# Patient Record
Sex: FEMALE | Race: WHITE | URBAN - METROPOLITAN AREA
[De-identification: names, ages, dates, MRNs, and addresses within clinical notes are randomized per-mention and may not be internally consistent; named-entity substitution may affect disease eponyms.]

---

## 2019-04-29 LAB
B-HCG FREE SERPL-ACNC: 1 MIU/ML
BUN SERPL-MCNC: 15 MG/DL (ref 7–18)
CALCIUM SERPL-MCNC: 8.9 MG/DL (ref 8.5–10.1)
CHLORIDE SERPL-SCNC: 105 MMOL/L (ref 98–107)
CO2 SERPL-SCNC: 31 MMOL/L (ref 21–32)
CREAT SERPL-MCNC: 0.7 MG/DL (ref 0.55–1.02)
CREAT/UREA NIT SERPL: 21.4
ERYTHROCYTE [DISTWIDTH] IN BLOOD BY AUTOMATED COUNT: 13.2 % (ref 11.5–17)
GLUCOSE SERPL-MCNC: 92 MG/DL (ref 74–106)
HCT VFR BLD AUTO: 41 % (ref 37–47)
HGB BLD-MCNC: 13.7 GM/DL (ref 12–16)
MCH RBC QN AUTO: 30.4 PG (ref 27–31)
MCHC RBC AUTO-ENTMCNC: 33.4 GM/DL (ref 33–36)
MCV RBC AUTO: 90.9 FL (ref 80–94)
PLATELET # BLD AUTO: 297 X10(3)/MCL (ref 130–400)
PMV BLD AUTO: 11.6 FL (ref 9.4–12.4)
POTASSIUM SERPL-SCNC: 4 MMOL/L (ref 3.5–5.1)
RBC # BLD AUTO: 4.51 X10(6)/MCL (ref 4.2–5.4)
SODIUM SERPL-SCNC: 140 MMOL/L (ref 136–145)
WBC # SPEC AUTO: 7.4 X10(3)/MCL (ref 4.5–11.5)

## 2019-05-02 ENCOUNTER — HISTORICAL (OUTPATIENT)
Dept: ADMINISTRATIVE | Facility: HOSPITAL | Age: 59
End: 2019-05-02

## 2019-05-02 LAB
B-HCG FREE SERPL-ACNC: <1 MIU/ML
GROUP & RH: NORMAL

## 2022-04-09 ENCOUNTER — HISTORICAL (OUTPATIENT)
Dept: ADMINISTRATIVE | Facility: HOSPITAL | Age: 62
End: 2022-04-09

## 2022-04-26 VITALS
BODY MASS INDEX: 30.14 KG/M2 | HEIGHT: 65 IN | SYSTOLIC BLOOD PRESSURE: 148 MMHG | WEIGHT: 180.88 LBS | DIASTOLIC BLOOD PRESSURE: 82 MMHG

## 2022-04-30 NOTE — OP NOTE
Patient:   Meli Reed            MRN: [id number]            FIN: [id number]               Age:   58 years     Sex:  Female     :  1960   Associated Diagnoses:   None   Author:   Norma MACHADO, Gumaro CANTU      Operative Note   Preoperative Diagnosis:    1. post-menopausal uterine  bleeding  2. thickened endometrium  3. cervical stenosis    Postoperative diagnosis:    1. post-menopausal uterine  bleeding  2. thickened endometrium  3. cervical stenosis  4. endocervical polyp      Operation:     1. dilation and curettage  2. diagnostic hysteroscopy  3. endocervical polypectomy    Surgeon: Dr. Green    Findings: Approximately 8week size anteverted uterus, moderately thickened endometrium, normal-appearing tubal ostia bilaterally, appprox 2mm endocervical polyp    IV: 700ml    Urine output: 100ml    Estimated blood loss: 20ml    Fluid Deficit: 200ml of NS    Anesthesia: General with LMA    Procedure:    After informed consent was verified, the patient was taken to the operating room with IV fluid running. The patient was administered general anesthesia,  then prepped and draped in low lithotomy position using Torey stirrups.  Bladder was drained with a straight catheter after which exam under anesthesia was performed with findings as described above.  A weighted speculum was then placed in the posterior fornix of the vagina. With the assistance of a retractor the anterior lip of the cervix was identified and grasped with a single-tooth tenaculum. The cervix was then  dilated with Hegar dilators to approximately 8 mm. The diagnostic hysterocope was assembled and introduced into the uterine cavity using NS as distension media with findings as described above.  The endocervical polyp was removed with the hysteroscopic biopsy forceps   Next the sharp curette was used to remove remaining fragments of tissue until a gritty texture was noted in all 4 quadrants.   A repeat hysteroscopy confirmed a clear uterine  cavity without polys of submucous fibroids.    The tenaculum was removed the tenaculum site and endocervix were noted to be hemostatic. The weighted speculum was removed.  She was awakened and returned to the recovery room in stable condition.    Complications: None    Specimens:  1. endometrial curretings  2. endocervical polyp